# Patient Record
Sex: FEMALE | Race: WHITE | Employment: UNEMPLOYED | URBAN - METROPOLITAN AREA
[De-identification: names, ages, dates, MRNs, and addresses within clinical notes are randomized per-mention and may not be internally consistent; named-entity substitution may affect disease eponyms.]

---

## 2018-09-25 ENCOUNTER — HOSPITAL ENCOUNTER (INPATIENT)
Age: 63
LOS: 7 days | Discharge: HOME | DRG: 885 | End: 2018-10-02
Attending: EMERGENCY MEDICINE | Admitting: PSYCHIATRY & NEUROLOGY

## 2018-09-25 DIAGNOSIS — F31.2 BIPOLAR DISORDER, CURRENT EPISODE MANIC SEVERE WITH PSYCHOTIC FEATURES (HCC): Primary | ICD-10-CM

## 2018-09-25 DIAGNOSIS — F30.9 MANIA (HCC): ICD-10-CM

## 2018-09-25 PROBLEM — F29 PSYCHOSIS (HCC): Status: ACTIVE | Noted: 2018-09-25

## 2018-09-25 LAB
A/G RATIO: 2 (ref 1.1–2.2)
ALBUMIN SERPL-MCNC: 4.8 G/DL (ref 3.4–5)
ALP BLD-CCNC: 89 U/L (ref 40–129)
ALT SERPL-CCNC: 24 U/L (ref 10–40)
AMPHETAMINE SCREEN, URINE: NORMAL
ANION GAP SERPL CALCULATED.3IONS-SCNC: 13 MMOL/L (ref 3–16)
AST SERPL-CCNC: 22 U/L (ref 15–37)
BACTERIA: ABNORMAL /HPF
BARBITURATE SCREEN URINE: NORMAL
BASOPHILS ABSOLUTE: 0 K/UL (ref 0–0.2)
BASOPHILS RELATIVE PERCENT: 0.4 %
BENZODIAZEPINE SCREEN, URINE: NORMAL
BILIRUB SERPL-MCNC: 0.6 MG/DL (ref 0–1)
BILIRUBIN URINE: NEGATIVE
BLOOD, URINE: NEGATIVE
BUN BLDV-MCNC: 13 MG/DL (ref 7–20)
CALCIUM SERPL-MCNC: 10.2 MG/DL (ref 8.3–10.6)
CANNABINOID SCREEN URINE: NORMAL
CHLORIDE BLD-SCNC: 99 MMOL/L (ref 99–110)
CLARITY: CLEAR
CO2: 26 MMOL/L (ref 21–32)
COCAINE METABOLITE SCREEN URINE: NORMAL
COLOR: YELLOW
CREAT SERPL-MCNC: 0.6 MG/DL (ref 0.6–1.2)
EOSINOPHILS ABSOLUTE: 0 K/UL (ref 0–0.6)
EOSINOPHILS RELATIVE PERCENT: 0.5 %
EPITHELIAL CELLS, UA: ABNORMAL /HPF
ETHANOL: NORMAL MG/DL (ref 0–0.08)
GFR AFRICAN AMERICAN: >60
GFR NON-AFRICAN AMERICAN: >60
GLOBULIN: 2.4 G/DL
GLUCOSE BLD-MCNC: 231 MG/DL (ref 70–99)
GLUCOSE URINE: 500 MG/DL
HCT VFR BLD CALC: 42.6 % (ref 36–48)
HEMOGLOBIN: 14.5 G/DL (ref 12–16)
KETONES, URINE: NEGATIVE MG/DL
LEUKOCYTE ESTERASE, URINE: ABNORMAL
LYMPHOCYTES ABSOLUTE: 1.1 K/UL (ref 1–5.1)
LYMPHOCYTES RELATIVE PERCENT: 20 %
Lab: NORMAL
MCH RBC QN AUTO: 31.7 PG (ref 26–34)
MCHC RBC AUTO-ENTMCNC: 34.1 G/DL (ref 31–36)
MCV RBC AUTO: 92.9 FL (ref 80–100)
METHADONE SCREEN, URINE: NORMAL
MICROSCOPIC EXAMINATION: YES
MONOCYTES ABSOLUTE: 0.4 K/UL (ref 0–1.3)
MONOCYTES RELATIVE PERCENT: 7.2 %
NEUTROPHILS ABSOLUTE: 3.9 K/UL (ref 1.7–7.7)
NEUTROPHILS RELATIVE PERCENT: 71.9 %
NITRITE, URINE: NEGATIVE
OPIATE SCREEN URINE: NORMAL
OXYCODONE URINE: NORMAL
PDW BLD-RTO: 13 % (ref 12.4–15.4)
PH UA: 6
PH UA: 6
PHENCYCLIDINE SCREEN URINE: NORMAL
PLATELET # BLD: 256 K/UL (ref 135–450)
PMV BLD AUTO: 9 FL (ref 5–10.5)
POTASSIUM REFLEX MAGNESIUM: 3.7 MMOL/L (ref 3.5–5.1)
PROPOXYPHENE SCREEN: NORMAL
PROTEIN UA: NEGATIVE MG/DL
RBC # BLD: 4.59 M/UL (ref 4–5.2)
RBC UA: ABNORMAL /HPF (ref 0–2)
SALICYLATE, SERUM: 0.4 MG/DL (ref 15–30)
SODIUM BLD-SCNC: 138 MMOL/L (ref 136–145)
SPECIFIC GRAVITY UA: 1.01
TOTAL PROTEIN: 7.2 G/DL (ref 6.4–8.2)
URINE REFLEX TO CULTURE: YES
URINE TYPE: ABNORMAL
UROBILINOGEN, URINE: 0.2 E.U./DL
WBC # BLD: 5.5 K/UL (ref 4–11)
WBC UA: ABNORMAL /HPF (ref 0–5)

## 2018-09-25 PROCEDURE — G0480 DRUG TEST DEF 1-7 CLASSES: HCPCS

## 2018-09-25 PROCEDURE — 87086 URINE CULTURE/COLONY COUNT: CPT

## 2018-09-25 PROCEDURE — 36415 COLL VENOUS BLD VENIPUNCTURE: CPT

## 2018-09-25 PROCEDURE — 99285 EMERGENCY DEPT VISIT HI MDM: CPT

## 2018-09-25 PROCEDURE — 1240000000 HC EMOTIONAL WELLNESS R&B

## 2018-09-25 PROCEDURE — 85025 COMPLETE CBC W/AUTO DIFF WBC: CPT

## 2018-09-25 PROCEDURE — 81001 URINALYSIS AUTO W/SCOPE: CPT

## 2018-09-25 PROCEDURE — 80053 COMPREHEN METABOLIC PANEL: CPT

## 2018-09-25 PROCEDURE — 83036 HEMOGLOBIN GLYCOSYLATED A1C: CPT

## 2018-09-25 PROCEDURE — 6370000000 HC RX 637 (ALT 250 FOR IP): Performed by: PSYCHIATRY & NEUROLOGY

## 2018-09-25 PROCEDURE — 80307 DRUG TEST PRSMV CHEM ANLYZR: CPT

## 2018-09-25 RX ORDER — HYDROXYZINE PAMOATE 50 MG/1
50 CAPSULE ORAL 3 TIMES DAILY PRN
Status: DISCONTINUED | OUTPATIENT
Start: 2018-09-25 | End: 2018-10-02 | Stop reason: HOSPADM

## 2018-09-25 RX ORDER — MAGNESIUM HYDROXIDE/ALUMINUM HYDROXICE/SIMETHICONE 120; 1200; 1200 MG/30ML; MG/30ML; MG/30ML
30 SUSPENSION ORAL EVERY 6 HOURS PRN
Status: DISCONTINUED | OUTPATIENT
Start: 2018-09-25 | End: 2018-10-02 | Stop reason: HOSPADM

## 2018-09-25 RX ORDER — ACETAMINOPHEN 325 MG/1
650 TABLET ORAL EVERY 4 HOURS PRN
Status: DISCONTINUED | OUTPATIENT
Start: 2018-09-25 | End: 2018-10-02 | Stop reason: HOSPADM

## 2018-09-25 RX ORDER — BISACODYL 10 MG
10 SUPPOSITORY, RECTAL RECTAL DAILY PRN
Status: DISCONTINUED | OUTPATIENT
Start: 2018-09-25 | End: 2018-10-02 | Stop reason: HOSPADM

## 2018-09-25 RX ORDER — RISPERIDONE 1 MG/1
1 TABLET, FILM COATED ORAL NIGHTLY
Status: DISCONTINUED | OUTPATIENT
Start: 2018-09-25 | End: 2018-09-26

## 2018-09-25 RX ORDER — HALOPERIDOL 5 MG/ML
5 INJECTION INTRAMUSCULAR EVERY 6 HOURS PRN
Status: DISCONTINUED | OUTPATIENT
Start: 2018-09-25 | End: 2018-10-02 | Stop reason: HOSPADM

## 2018-09-25 RX ORDER — TRAZODONE HYDROCHLORIDE 50 MG/1
50 TABLET ORAL NIGHTLY PRN
Status: DISCONTINUED | OUTPATIENT
Start: 2018-09-25 | End: 2018-09-26

## 2018-09-25 RX ORDER — LANOLIN ALCOHOL/MO/W.PET/CERES
3 CREAM (GRAM) TOPICAL NIGHTLY PRN
COMMUNITY

## 2018-09-25 RX ORDER — IBUPROFEN 400 MG/1
400 TABLET ORAL EVERY 6 HOURS PRN
Status: DISCONTINUED | OUTPATIENT
Start: 2018-09-25 | End: 2018-10-02 | Stop reason: HOSPADM

## 2018-09-25 RX ADMIN — RISPERIDONE 1 MG: 1 TABLET ORAL at 21:20

## 2018-09-25 ASSESSMENT — SLEEP AND FATIGUE QUESTIONNAIRES
RESTFUL SLEEP: NO
DIFFICULTY FALLING ASLEEP: YES
DO YOU USE A SLEEP AID: YES
DIFFICULTY ARISING: NO
DO YOU HAVE DIFFICULTY SLEEPING: YES
SLEEP PATTERN: DIFFICULTY FALLING ASLEEP
DIFFICULTY STAYING ASLEEP: YES
AVERAGE NUMBER OF SLEEP HOURS: 3

## 2018-09-25 ASSESSMENT — LIFESTYLE VARIABLES: HISTORY_ALCOHOL_USE: NO

## 2018-09-25 ASSESSMENT — ENCOUNTER SYMPTOMS
RESPIRATORY NEGATIVE: 1
GASTROINTESTINAL NEGATIVE: 1
EYES NEGATIVE: 1

## 2018-09-25 NOTE — ED PROVIDER NOTES
I independently examined and evaluated Megan James. In brief history of present illness revealed 81-year-old female who presents with  requesting mental health evaluation. Patient reports had episode of depression 3 years ago requiring inpatient stay. Reports improved significantly was weaned off antidepressants over 2 years ago. Now reports return of insomnia and difficulty sleeping. States she can't sleep because she has [place] kiley and peace that I keep thinking about it and talking to God and I can't sleep\". Denies depression or suicidal ideation.  reports symptoms are similar to previous episode when she required hospitalization. Patient denies racing thoughts. Physical examination revealed a well-appearing female who is in no acute distress. She is alert with fluent speech. Affect is effusive with upbeat mood. Denies SI. Skin is warm and dry.     I have reviewed and interpreted all of the currently available lab results from this visit:  Results for orders placed or performed during the hospital encounter of 09/25/18   CBC auto differential   Result Value Ref Range    WBC 5.5 4.0 - 11.0 K/uL    RBC 4.59 4.00 - 5.20 M/uL    Hemoglobin 14.5 12.0 - 16.0 g/dL    Hematocrit 42.6 36.0 - 48.0 %    MCV 92.9 80.0 - 100.0 fL    MCH 31.7 26.0 - 34.0 pg    MCHC 34.1 31.0 - 36.0 g/dL    RDW 13.0 12.4 - 15.4 %    Platelets 091 213 - 367 K/uL    MPV 9.0 5.0 - 10.5 fL    Neutrophils % 71.9 %    Lymphocytes % 20.0 %    Monocytes % 7.2 %    Eosinophils % 0.5 %    Basophils % 0.4 %    Neutrophils # 3.9 1.7 - 7.7 K/uL    Lymphocytes # 1.1 1.0 - 5.1 K/uL    Monocytes # 0.4 0.0 - 1.3 K/uL    Eosinophils # 0.0 0.0 - 0.6 K/uL    Basophils # 0.0 0.0 - 0.2 K/uL   Comprehensive Metabolic Panel w/ Reflex to MG   Result Value Ref Range    Sodium 138 136 - 145 mmol/L    Potassium reflex Magnesium 3.7 3.5 - 5.1 mmol/L    Chloride 99 99 - 110 mmol/L    CO2 26 21 - 32 mmol/L    Anion Gap 13 3 - 16    Glucose 231

## 2018-09-25 NOTE — ED PROVIDER NOTES
the ROS, all other systems were reviewed and negative. PAST MEDICAL HISTORY     Past Medical History:   Diagnosis Date    Depression          SURGICAL HISTORY     History reviewed. No pertinent surgical history. CURRENT MEDICATIONS       Discharge Medication List as of 10/2/2018  9:35 PM      CONTINUE these medications which have NOT CHANGED    Details   melatonin 3 MG TABS tablet Take 3 mg by mouth nightly as neededHistorical Med               ALLERGIES     Patient has no known allergies. FAMILY HISTORY       Family History   Problem Relation Age of Onset    Heart Attack Father     High Blood Pressure Father     Cancer Maternal Grandmother           SOCIAL HISTORY       Social History     Social History    Marital status:      Spouse name: N/A    Number of children: 4    Years of education: N/A     Occupational History     Not Employed     Social History Main Topics    Smoking status: Never Smoker    Smokeless tobacco: Never Used    Alcohol use No    Drug use: No    Sexual activity: Yes     Partners: Male     Other Topics Concern    None     Social History Narrative    None       SCREENINGS    Dodge Coma Scale  Eye Opening: Spontaneous  Best Verbal Response: Oriented  Best Motor Response: Obeys commands  Dodge Coma Scale Score: 15        PHYSICAL EXAM    (up to 7 for level 4, 8 or more for level 5)     ED Triage Vitals [09/25/18 1023]   BP Temp Temp Source Pulse Resp SpO2 Height Weight   (!) 174/92 98.5 °F (36.9 °C) Oral 82 16 99 % 5' 4\" (1.626 m) 157 lb (71.2 kg)       Physical Exam   Constitutional: She is oriented to person, place, and time. She appears well-developed and well-nourished. HENT:   Head: Normocephalic and atraumatic. Nose: Nose normal.   Eyes: Conjunctivae are normal. Right eye exhibits no discharge. Left eye exhibits no discharge. Neck: Trachea normal, normal range of motion and full passive range of motion without pain. Neck supple.    Cardiovascular: Abnormal; Notable for the following:     POC Glucose 168 (*)     All other components within normal limits    Narrative:     Performed at:  [facility] [address],  [place], [employer]   Phone [phone]   POCT GLUCOSE - Abnormal; Notable for the following:     POC Glucose 282 (*)     All other components within normal limits    Narrative:     Performed at:  [facility] [address],  [place], [employer]   Phone [phone]   POCT GLUCOSE - Abnormal; Notable for the following:     POC Glucose 180 (*)     All other components within normal limits    Narrative:     Performed at:  [facility] [address],  [place], [employer]   Phone [phone]   POCT GLUCOSE - Abnormal; Notable for the following:     POC Glucose 211 (*)     All other components within normal limits    Narrative:     Performed at:  [place] [address],  [place], [employer]   Phone [phone]   POCT GLUCOSE - Abnormal; Notable for the following:     POC Glucose 193 (*)     All other components within normal limits    Narrative:     Performed at:  [facility] [address],  [place], [employer]   Phone [phone]   POCT GLUCOSE - Abnormal; Notable for the following:     POC Glucose 195 (*)     All other components within normal limits    Narrative:     Performed at:  [facility] [address],  [place], [employer]   Phone [phone]   POCT GLUCOSE - Abnormal; Notable for the following:     POC Glucose 257 (*)     All other components within normal limits    Narrative:     Performed at:  [facility] [address],  [place], [employer]   Phone [phone]   POCT GLUCOSE - Abnormal; Notable for the following:     POC Glucose 150 (*)     All other components within Laboratory  [address],  [place], [facility]   Phone [phone]   POCT GLUCOSE - Abnormal; Notable for the following:     POC Glucose 192 (*)     All other components within normal limits    Narrative:     Performed at:  [facility] [address],  [place], [facility]   Phone [phone]   POCT GLUCOSE - Abnormal; Notable for the following:     POC Glucose 133 (*)     All other components within normal limits    Narrative:     Performed at:  [place] [address],  [place], [facility]   Phone [phone]   POCT GLUCOSE - Abnormal; Notable for the following:     POC Glucose 127 (*)     All other components within normal limits    Narrative:     Performed at:  [place] [address],  [place], [facility]   Phone [phone]   POCT GLUCOSE - Abnormal; Notable for the following:     POC Glucose 131 (*)     All other components within normal limits    Narrative:     Performed at:  [place] [address],  [place], [facility]   Phone [phone]   POCT GLUCOSE - Abnormal; Notable for the following:     POC Glucose 254 (*)     All other components within normal limits    Narrative:     Performed at:  [place] [address],  [place], [facility]   Phone [phone]   POCT GLUCOSE - Abnormal; Notable for the following:     POC Glucose 128 (*)     All other components within normal limits    Narrative:     Performed at:  [place] [address],  [place], [facility]   Phone [phone]   POCT GLUCOSE - Abnormal; Notable for the following:     POC Glucose 158 (*)     All other components within normal limits    Narrative:     Performed at:  [place] [address],  [place], [facility]   Phone [phone]   POCT GLUCOSE - images are visualized and preliminarily interpreted by the  ED Provider with the below findings:        Interpretation per the Radiologist below, if available at the time of this note:    No orders to display     No results found. PROCEDURES   Unless otherwise noted below, none     Procedures    CRITICAL CARE TIME   N/A    CONSULTS:  None      EMERGENCY DEPARTMENT COURSE and DIFFERENTIAL DIAGNOSIS/MDM:   Vitals:    Vitals:    09/30/18 2012 10/01/18 0830 10/01/18 2011 10/02/18 0845   BP: (!) 141/83 (!) 157/88 (!) 157/85 128/79   Pulse: 71 67 66 63   Resp: 16 16 16 16   Temp: 98.4 °F (36.9 °C) 97.8 °F (36.6 °C) 98.1 °F (36.7 °C) 97.5 °F (36.4 °C)   TempSrc: Oral Oral Oral Oral   SpO2:       Weight:       Height:           Patient was given the following medications:  Medications - No data to display    Patient presents for evaluation of worsening delusions and inability to fall asleep. On exam she is alert, oriented afebrile hemodynamically stable, breathing comfortable on room air, satting at 99%. Denies SI/HI. Denies having symptoms currently. Patient did have a history of major depressive disorder 3 years ago and was hospitalized for over 2 weeks however for the past 2-1/2 years she's been off all medications at this time. However over the last month she feels that her symptoms might be getting worse and with like to be evaluated by psych here. On exam abdomen is soft and nontender without any masses or bulges. Her neck is supple without lymphadenopathy. Conjunctiva is without injection. Patient is nontoxic appearing and in no acute distress. Plan will be to get basic blood work and then have psychiatry evaluate this patient at this time. CBC is unremarkable. BMP is unremarkable. Ethanol level is negative. Urine drug screen is negative. Urine does show some leukocytes and 10-20 white blood cells. However patient is not complaining of any urinary symptoms at this time.   We will have psych

## 2018-09-25 NOTE — ED TRIAGE NOTES
Chief Complaint   Patient presents with    Psychiatric Evaluation      states patient has been having difficulty sleeping and delusional at times. States symptoms are intermittent. States she is not having symptoms today. States 3 years ago she had an episode of depression and was seen by psychiatrist at that time. Denies pain. Denies fever. Denies dysuria.   Denies SI/HI

## 2018-09-25 NOTE — ED NOTES
Report given to Adal Dunham. Pt ambulated back to [facility] accompanied by  and RN with steady gait.      Han Julio RN  09/25/18 8529
\"Everyone thought it was [facility] that helped but really it was God who worked through me and showed me what it was like to have the kiley in me. It was like I was a child again and could relearn all of these new experiences over again. \" Pt states she was started on Wellbutrin and Risperdal at [facility] but eventually stopped taking these medications and has only been taking an herbal supplement called Protandim which she spoke about at length and was unable to be redirected. Pt states she was raised in a strict Islam environment where the girls were to always have their feet covered, were unable to cut their hair and unable to wear jewelry. Pt states she is currently  to a  and reports the relationship as healthy. Pt denies all hx of suicide attempts and self-harm as she believes this to be a sin and states she would go to hell if she were to harm herself in any way. Pt denies all current SA, HI, and SI. Pt states she would like to stay in the hospital and be started on medication to help her sleep.        [address] [place] [address]  09/25/18 4303

## 2018-09-26 PROBLEM — F31.2 BIPOLAR DISORDER, CURRENT EPISODE MANIC SEVERE WITH PSYCHOTIC FEATURES (HCC): Status: ACTIVE | Noted: 2018-09-26

## 2018-09-26 LAB
GLUCOSE BLD-MCNC: 168 MG/DL (ref 70–99)
GLUCOSE BLD-MCNC: 282 MG/DL (ref 70–99)
PERFORMED ON: ABNORMAL
PERFORMED ON: ABNORMAL
URINE CULTURE, ROUTINE: NORMAL

## 2018-09-26 PROCEDURE — 90792 PSYCH DIAG EVAL W/MED SRVCS: CPT | Performed by: NURSE PRACTITIONER

## 2018-09-26 PROCEDURE — 99222 1ST HOSP IP/OBS MODERATE 55: CPT | Performed by: PHYSICIAN ASSISTANT

## 2018-09-26 PROCEDURE — 1240000000 HC EMOTIONAL WELLNESS R&B

## 2018-09-26 PROCEDURE — 6370000000 HC RX 637 (ALT 250 FOR IP): Performed by: PHYSICIAN ASSISTANT

## 2018-09-26 PROCEDURE — 6370000000 HC RX 637 (ALT 250 FOR IP): Performed by: NURSE PRACTITIONER

## 2018-09-26 RX ORDER — OLANZAPINE 5 MG/1
5 TABLET ORAL NIGHTLY
Status: DISCONTINUED | OUTPATIENT
Start: 2018-09-26 | End: 2018-09-28

## 2018-09-26 RX ORDER — NICOTINE POLACRILEX 4 MG
15 LOZENGE BUCCAL PRN
Status: DISCONTINUED | OUTPATIENT
Start: 2018-09-26 | End: 2018-10-02 | Stop reason: HOSPADM

## 2018-09-26 RX ORDER — DEXTROSE MONOHYDRATE 50 MG/ML
100 INJECTION, SOLUTION INTRAVENOUS PRN
Status: DISCONTINUED | OUTPATIENT
Start: 2018-09-26 | End: 2018-10-02 | Stop reason: HOSPADM

## 2018-09-26 RX ORDER — DEXTROSE MONOHYDRATE 25 G/50ML
12.5 INJECTION, SOLUTION INTRAVENOUS PRN
Status: DISCONTINUED | OUTPATIENT
Start: 2018-09-26 | End: 2018-10-02 | Stop reason: HOSPADM

## 2018-09-26 RX ORDER — OLANZAPINE 5 MG/1
5 TABLET ORAL 2 TIMES DAILY PRN
Status: DISCONTINUED | OUTPATIENT
Start: 2018-09-26 | End: 2018-10-02 | Stop reason: HOSPADM

## 2018-09-26 RX ADMIN — INSULIN LISPRO 2 UNITS: 100 INJECTION, SOLUTION INTRAVENOUS; SUBCUTANEOUS at 20:30

## 2018-09-26 RX ADMIN — OLANZAPINE 5 MG: 5 TABLET, FILM COATED ORAL at 20:24

## 2018-09-26 NOTE — H&P
Initial Psychiatric Diagnostic Evaluation  History and Physical    Galen Momin  [id number]      Reason for Admission:  Context: Progression of illness  Location: Mood  Duration: 7 days. Severity on Admission: severe  Associated Symptoms on Admission: veronica, insomnia, increased energy, decreased need to sleep, hyperverbal, tangential, religiously preoccupied, increased self-esteem, increased socialization, increased libido, decreased appetite, mood congruent psychotic symptoms - God talks to her  Modifying Factors: not on medications    Initial Presentation:  Taken from [facility] Note  Patient presents to [facility] voluntarily after she was brought in by her . Patient was clinically sober at the time of the evaluation. Patient was evaluated and offered supportive counseling. Collateral information was gathered by RN from pt's , Minor Hoover. Pt reports her  has been concerned about her because he believes her to be delusional. Pt states she has not been sleeping well for a few weeks and enjoys this time that she is not sleeping because she is able to talk to [address] and God. Pt states she heard Freedom davey to her that she had been saved and became tearful while discussing this moment. Pt states she does not believe she is delusional or hearing voices but that, \"They just don't understand. I do talk to [address]. He tells me the most wonderful things that no one could ever understand! \" Pt states she believes that through [address] she can know what others are thinking and has been able to heal them through their minds. Pt reports hx of being dx with clinical depression and had been admitted to [facility] in [place], [place] 3 years ago. Pt states, \"Everyone thought it was [facility] that helped but really it was God who worked through me and showed me what it was like to have the kiley in me. It was like I was a child again and could relearn all of these new experiences over again. \" Pt states she was started on Wellbutrin Relation Age of Onset    Heart Attack Father     High Blood Pressure Father     Cancer Maternal Grandmother       Prescriptions Prior to Admission: melatonin 3 MG TABS tablet, Take 3 mg by mouth nightly as needed  No Known Allergies     Review of Systems  Review of Systems   Psychiatric/Behavioral: Positive for hallucinations. Negative for depression, memory loss, substance abuse and suicidal ideas. The patient has insomnia. The patient is not nervous/anxious. All other systems reviewed and are negative. Scheduled Meds   OLANZapine  5 mg Oral Nightly       PRN Meds  OLANZapine, acetaminophen, ibuprofen, hydrOXYzine, haloperidol lactate, magnesium hydroxide, bisacodyl, aluminum & magnesium hydroxide-simethicone    OBJECTIVE  Vital Signs:  Vitals:    09/26/18 0755   BP: (!) 134/90   Pulse: 99   Resp: 16   Temp: 97.7 °F (36.5 °C)   SpO2:        Labs:  No results found for this or any previous visit (from the past 24 hour(s)).     Physical Assessment: Per Internal Medicine    PSYCHIATRIC ASSESSMENT   PSYCHIATRIC EXAMINATION / MENTAL STATUS EXAM:     General appearance: []  adequately dressed and groomed [x] disheveled [] Thin [] Obese        Relatedness: [x] cooperative [] guarded [] indifferent [] hostile [] sedated    Speech:  [] normal prosody [x] pressured [] decreased volume [] increased volume [] delayed     Kinetics: [] normal [x] increased [] decreased    Mood: [] depressed [] anxious [] irritable [] labile [x] euphoric [] decreased range    Affect: [x] bright [] blunted [] flat [] labile [] mood incongruent     Thought Process: [] logical [] tangential [] CHANDU [] simplistic [] disorganized [x]FOI [] concrete     Thought Content: [] future oriented [x] delusions [] self-harm [] guilt       [] hopelessness  [] obsessive [] superficial [] paranoid     Hallucination Reported  [] None [x] auditory  [] visual  [] olfactory [] tactile  Observed RTIS:         [x] None [] auditory  [] visual []

## 2018-09-26 NOTE — BH NOTE
[facility]  Initial Interdisciplinary Treatment Plan NOTE    Review Date & Time: 9/26/18 1000    Patient was not in treatment team    Admission Type:   Admission Type: Voluntary    Reason for admission:  Reason for Admission: Hearing voices from Milana Lowe, feels like despair was lifted.  concerned due to lack of sleep.        Estimated Length of Stay Update:  3-5 days  Estimated Discharge Date Update: 9/29/18-10/1/18    PATIENT STRENGTHS:  Patient Strengths Strengths: Communication  Patient Strengths and Limitations:Limitations: Unrealistic self-view  Addictive Behavior:Addictive Behavior  In the past 3 months, have you felt or has someone told you that you have a problem with:  : None  Do you have a history of Chemical Use?: No  Do you have a history of Alcohol Use?: No  Do you have a history of Street Drug Abuse?: No  Histroy of Prescripton Drug Abuse?: No  Medical Problems:  Past Medical History:   Diagnosis Date    Depression        EDUCATION:   Learner Progress Toward Treatment Goals: Reviewed goals and plan of care    Method: Individual    Outcome: Verbalized understanding    PATIENT GOALS: \" get rest\"    PLAN/TREATMENT RECOMMENDATIONS UPDATE: evaluate for treatment    GOALS UPDATE:   Time frame for Short-Term Goals: 2 days    Susan Dutton RN

## 2018-09-27 LAB
ESTIMATED AVERAGE GLUCOSE: 231.7 MG/DL
GLUCOSE BLD-MCNC: 180 MG/DL (ref 70–99)
GLUCOSE BLD-MCNC: 193 MG/DL (ref 70–99)
GLUCOSE BLD-MCNC: 195 MG/DL (ref 70–99)
GLUCOSE BLD-MCNC: 211 MG/DL (ref 70–99)
GLUCOSE BLD-MCNC: 257 MG/DL (ref 70–99)
HBA1C MFR BLD: 9.7 %
PERFORMED ON: ABNORMAL

## 2018-09-27 PROCEDURE — 6370000000 HC RX 637 (ALT 250 FOR IP): Performed by: NURSE PRACTITIONER

## 2018-09-27 PROCEDURE — 99233 SBSQ HOSP IP/OBS HIGH 50: CPT | Performed by: NURSE PRACTITIONER

## 2018-09-27 PROCEDURE — 6370000000 HC RX 637 (ALT 250 FOR IP): Performed by: PHYSICIAN ASSISTANT

## 2018-09-27 PROCEDURE — 1240000000 HC EMOTIONAL WELLNESS R&B

## 2018-09-27 RX ADMIN — METFORMIN HYDROCHLORIDE 500 MG: 500 TABLET ORAL at 21:21

## 2018-09-27 RX ADMIN — INSULIN LISPRO 2 UNITS: 100 INJECTION, SOLUTION INTRAVENOUS; SUBCUTANEOUS at 21:25

## 2018-09-27 RX ADMIN — INSULIN LISPRO 1 UNITS: 100 INJECTION, SOLUTION INTRAVENOUS; SUBCUTANEOUS at 08:34

## 2018-09-27 RX ADMIN — OLANZAPINE 5 MG: 5 TABLET, FILM COATED ORAL at 21:22

## 2018-09-27 RX ADMIN — INSULIN LISPRO 1 UNITS: 100 INJECTION, SOLUTION INTRAVENOUS; SUBCUTANEOUS at 13:29

## 2018-09-27 ASSESSMENT — SLEEP AND FATIGUE QUESTIONNAIRES
DIFFICULTY STAYING ASLEEP: YES
DO YOU HAVE DIFFICULTY SLEEPING: YES
DIFFICULTY ARISING: NO
RESTFUL SLEEP: NO
SLEEP PATTERN: DIFFICULTY FALLING ASLEEP;INSOMNIA
DO YOU USE A SLEEP AID: YES
AVERAGE NUMBER OF SLEEP HOURS: 3
DIFFICULTY FALLING ASLEEP: YES

## 2018-09-27 ASSESSMENT — LIFESTYLE VARIABLES: HISTORY_ALCOHOL_USE: NO

## 2018-09-27 NOTE — BH NOTE
Comments: + interactions, + contribution, and + engagement.   Accomplished goal    Time: 1406-8067      Type of Group: WRAPUP GROUP      Level of Participation: Active participant

## 2018-09-27 NOTE — PROGRESS NOTES
5.            Plan   Reviewed nursing and ancillary staff notes from last 24 hours. Evaluated medications and assessed for side effects and effectiveness. Assessed patient's educational needs including reviewing Plan of Care, medications and diagnosis.     Requires Inpatient Hospitalization as Least Restrictive Environment: YES     1. Bipolar:  · On admission: Started Risperdal 1 mg HS  · Day 1: D/C Risperdal, Trial Zyprexa 5 mg HS, She has absolutely no insight and believes her past depression and current veronica are a result of divine intervention, she was provided with a list of symptoms of bipolar disorder and asked her to review and pick which symptoms she can identify with   · Day 2: Slept 8 hours, more organized, remains euphoric and manic, we reviewed list of symptoms of bipolar disorder, she checked all but 2  · Multidisciplinary evaluation    · Encourage participation in therapeutic programming and milieu activities    · Monitor for safety    2. Legal: voluntary   3.  Discharge:   · Collaborate with SW to gather collateral and determine appropriate community support and disposition     · Follow up/Disposition: TBD    Total time: 35 minutes spent with patient completing evaluation process and more than 50% of the time was spent completing evaluation and planning treatment with the patient    Dr. Ange Sprague, DNP, APRN, PMHNP  09/27/18

## 2018-09-28 LAB
GLUCOSE BLD-MCNC: 148 MG/DL (ref 70–99)
GLUCOSE BLD-MCNC: 150 MG/DL (ref 70–99)
GLUCOSE BLD-MCNC: 154 MG/DL (ref 70–99)
GLUCOSE BLD-MCNC: 195 MG/DL (ref 70–99)
PERFORMED ON: ABNORMAL

## 2018-09-28 PROCEDURE — 6370000000 HC RX 637 (ALT 250 FOR IP): Performed by: PHYSICIAN ASSISTANT

## 2018-09-28 PROCEDURE — 99233 SBSQ HOSP IP/OBS HIGH 50: CPT | Performed by: NURSE PRACTITIONER

## 2018-09-28 PROCEDURE — 6370000000 HC RX 637 (ALT 250 FOR IP): Performed by: NURSE PRACTITIONER

## 2018-09-28 PROCEDURE — 1240000000 HC EMOTIONAL WELLNESS R&B

## 2018-09-28 RX ORDER — OLANZAPINE 5 MG/1
7.5 TABLET ORAL NIGHTLY
Status: DISCONTINUED | OUTPATIENT
Start: 2018-09-28 | End: 2018-10-01

## 2018-09-28 RX ADMIN — INSULIN LISPRO 1 UNITS: 100 INJECTION, SOLUTION INTRAVENOUS; SUBCUTANEOUS at 08:06

## 2018-09-28 RX ADMIN — INSULIN LISPRO 1 UNITS: 100 INJECTION, SOLUTION INTRAVENOUS; SUBCUTANEOUS at 12:02

## 2018-09-28 RX ADMIN — INSULIN LISPRO 1 UNITS: 100 INJECTION, SOLUTION INTRAVENOUS; SUBCUTANEOUS at 21:06

## 2018-09-28 RX ADMIN — METFORMIN HYDROCHLORIDE 500 MG: 500 TABLET ORAL at 08:06

## 2018-09-28 RX ADMIN — METFORMIN HYDROCHLORIDE 500 MG: 500 TABLET ORAL at 17:09

## 2018-09-28 RX ADMIN — INSULIN LISPRO 1 UNITS: 100 INJECTION, SOLUTION INTRAVENOUS; SUBCUTANEOUS at 17:09

## 2018-09-28 RX ADMIN — OLANZAPINE 7.5 MG: 5 TABLET, FILM COATED ORAL at 21:05

## 2018-09-28 NOTE — PROGRESS NOTES
suicidal ideas. The patient is not nervous/anxious and does not have insomnia. All other systems reviewed and are negative.         Social History  Social History     Social History    Marital status:      Spouse name: N/A    Number of children: 4    Years of education: N/A     Occupational History     Not Employed     Social History Main Topics    Smoking status: Never Smoker    Smokeless tobacco: Never Used    Alcohol use No    Drug use: No    Sexual activity: Yes     Partners: Male     Other Topics Concern    None     Social History Narrative    None       Scheduled Meds   OLANZapine  7.5 mg Oral Nightly    metFORMIN  500 mg Oral BID WC    insulin lispro  0-6 Units Subcutaneous TID WC    insulin lispro  0-3 Units Subcutaneous Nightly       PRN Meds  OLANZapine, glucose, dextrose, glucagon (rDNA), dextrose, acetaminophen, ibuprofen, hydrOXYzine, haloperidol lactate, magnesium hydroxide, bisacodyl, aluminum & magnesium hydroxide-simethicone    OBJECTIVE  Labs:  Recent Results (from the past 24 hour(s))   POCT Glucose    Collection Time: 09/27/18  5:09 PM   Result Value Ref Range    POC Glucose 195 (H) 70 - 99 mg/dl    Performed on ACCU-CHEK    POCT Glucose    Collection Time: 09/27/18  9:12 PM   Result Value Ref Range    POC Glucose 257 (H) 70 - 99 mg/dl    Performed on ACCU-CHEK    POCT Glucose    Collection Time: 09/28/18  6:23 AM   Result Value Ref Range    POC Glucose 150 (H) 70 - 99 mg/dl    Performed on ACCU-CHEK    POCT Glucose    Collection Time: 09/28/18 11:56 AM   Result Value Ref Range    POC Glucose 154 (H) 70 - 99 mg/dl    Performed on ACCU-CHEK        Physical Assessment:  Constitutional: No acute distress noted  HEENT: Atraumatic  Cardiovascular: VSS  Respiratory: no distress noted  Neurological: No cranial nerve abnormalities apparent  MS: No abnormalities apparent  Gait: WNL  Psychiatric: Please see below    Vital Signs:  Vitals:    09/26/18 1959 09/27/18 3695 09/27/18 1948 09/28/18 0827   BP: (!) 159/77 125/81 137/80 119/72   Pulse: 65 55 65 58   Resp: 16  16 16   Temp: 98.2 °F (36.8 °C) 98 °F (36.7 °C) 98.3 °F (36.8 °C) 98.2 °F (36.8 °C)   TempSrc: Oral Oral Oral Oral   SpO2:       Weight:       Height:             PSYCHIATRIC ASSESSMENT   Sleep: [] 0-3  [] 4-6 [x] 7-9   [] 10+  Appetite adequate:  [x] Yes  [] No  Attending to hygiene: [x] Yes  [] No   EPS: [] Yes  [x] No  Attending groups: [x] Yes  [] No  Patient reports side effects from psychiatric medications: [x] No  [] Yes:    Patient on more than 1 Antipsychotic: [x] No  [] Yes:    PSYCHIATRIC EXAMINATION / MENTAL STATUS EXAM:      General appearance: [x]  adequately dressed and groomed [] disheveled [] Thin [] Obese                Relatedness: [x] cooperative [] guarded [] indifferent [] hostile [] sedated     Speech:  [] normal prosody [x] pressured [] decreased volume [] increased volume [] delayed      Kinetics: [x] normal [] increased [] decreased     Mood: [] depressed [] anxious [] irritable [] labile [x] euphoric [] decreased range     Affect: [x] bright [] blunted [] flat [] labile [] mood incongruent      Thought Process: [] logical [x] tangential [] CHANDU [] simplistic [] disorganized []FOI [] concrete      Thought Content: [] future oriented [x] delusions [] self-harm [] guilt                             [] hopelessness  [] obsessive [] superficial [] paranoid      Hallucination Reported  [x] None [] auditory  [] visual  [] olfactory [] tactile  Observed RTIS:         [x] None [] auditory  [] visual [] tactile     Delusions: [] none [] grandiose [] paranoid  [] persecutory  [] somatic [x] Adventist/spiritual       Suicidal ideation: [x] denies [] endorses [] plan [] intent [] access  Homicidal ideation: [x] denies [] endorses [] plan [] intent [] access     Insight: [] good [] fair [] poor  [x] none  Judgment: [] good [] fair [x] poor  [] impulsive      Attention and concentration: [] intact [] limited [x]

## 2018-09-29 LAB
GLUCOSE BLD-MCNC: 132 MG/DL (ref 70–99)
GLUCOSE BLD-MCNC: 135 MG/DL (ref 70–99)
GLUCOSE BLD-MCNC: 173 MG/DL (ref 70–99)
GLUCOSE BLD-MCNC: 192 MG/DL (ref 70–99)
GLUCOSE BLD-MCNC: 193 MG/DL (ref 70–99)
PERFORMED ON: ABNORMAL

## 2018-09-29 PROCEDURE — 6370000000 HC RX 637 (ALT 250 FOR IP): Performed by: NURSE PRACTITIONER

## 2018-09-29 PROCEDURE — 6370000000 HC RX 637 (ALT 250 FOR IP): Performed by: PHYSICIAN ASSISTANT

## 2018-09-29 PROCEDURE — 1240000000 HC EMOTIONAL WELLNESS R&B

## 2018-09-29 RX ADMIN — METFORMIN HYDROCHLORIDE 500 MG: 500 TABLET ORAL at 08:06

## 2018-09-29 RX ADMIN — INSULIN LISPRO 1 UNITS: 100 INJECTION, SOLUTION INTRAVENOUS; SUBCUTANEOUS at 20:24

## 2018-09-29 RX ADMIN — OLANZAPINE 7.5 MG: 5 TABLET, FILM COATED ORAL at 20:23

## 2018-09-29 RX ADMIN — INSULIN LISPRO 1 UNITS: 100 INJECTION, SOLUTION INTRAVENOUS; SUBCUTANEOUS at 17:07

## 2018-09-29 RX ADMIN — METFORMIN HYDROCHLORIDE 500 MG: 500 TABLET ORAL at 17:05

## 2018-09-30 LAB
GLUCOSE BLD-MCNC: 127 MG/DL (ref 70–99)
GLUCOSE BLD-MCNC: 131 MG/DL (ref 70–99)
GLUCOSE BLD-MCNC: 133 MG/DL (ref 70–99)
GLUCOSE BLD-MCNC: 254 MG/DL (ref 70–99)
PERFORMED ON: ABNORMAL

## 2018-09-30 PROCEDURE — 6370000000 HC RX 637 (ALT 250 FOR IP): Performed by: PHYSICIAN ASSISTANT

## 2018-09-30 PROCEDURE — 1240000000 HC EMOTIONAL WELLNESS R&B

## 2018-09-30 PROCEDURE — 6370000000 HC RX 637 (ALT 250 FOR IP): Performed by: NURSE PRACTITIONER

## 2018-09-30 PROCEDURE — 99233 SBSQ HOSP IP/OBS HIGH 50: CPT | Performed by: PSYCHIATRY & NEUROLOGY

## 2018-09-30 RX ADMIN — METFORMIN HYDROCHLORIDE 500 MG: 500 TABLET ORAL at 08:05

## 2018-09-30 RX ADMIN — OLANZAPINE 7.5 MG: 5 TABLET, FILM COATED ORAL at 20:15

## 2018-09-30 RX ADMIN — METFORMIN HYDROCHLORIDE 500 MG: 500 TABLET ORAL at 17:12

## 2018-09-30 RX ADMIN — INSULIN LISPRO 2 UNITS: 100 INJECTION, SOLUTION INTRAVENOUS; SUBCUTANEOUS at 20:13

## 2018-09-30 NOTE — PROGRESS NOTES
Basophils % 09/25/2018 0.4  % Final    Neutrophils # 09/25/2018 3.9  1.7 - 7.7 K/uL Final    Lymphocytes # 09/25/2018 1.1  1.0 - 5.1 K/uL Final    Monocytes # 09/25/2018 0.4  0.0 - 1.3 K/uL Final    Eosinophils # 09/25/2018 0.0  0.0 - 0.6 K/uL Final    Basophils # 09/25/2018 0.0  0.0 - 0.2 K/uL Final    Sodium 09/25/2018 138  136 - 145 mmol/L Final    Potassium reflex Magnesium 09/25/2018 3.7  3.5 - 5.1 mmol/L Final    Chloride 09/25/2018 99  99 - 110 mmol/L Final    CO2 09/25/2018 26  21 - 32 mmol/L Final    Anion Gap 09/25/2018 13  3 - 16 Final    Glucose 09/25/2018 231* 70 - 99 mg/dL Final    BUN 09/25/2018 13  7 - 20 mg/dL Final    CREATININE 09/25/2018 0.6  0.6 - 1.2 mg/dL Final    GFR Non- 09/25/2018 >60  >60 Final    Comment: >60 mL/min/1.73m2 EGFR, calc. for ages 25 and older using the  MDRD formula (not corrected for weight), is valid for stable  renal function.  GFR  09/25/2018 >60  >60 Final    Comment: Chronic Kidney Disease: less than 60 ml/min/1.73 sq.m. Kidney Failure: less than 15 ml/min/1.73 sq.m. Results valid for patients 18 years and older.       Calcium 09/25/2018 10.2  8.3 - 10.6 mg/dL Final    Total Protein 09/25/2018 7.2  6.4 - 8.2 g/dL Final    Alb 09/25/2018 4.8  3.4 - 5.0 g/dL Final    Albumin/Globulin Ratio 09/25/2018 2.0  1.1 - 2.2 Final    Total Bilirubin 09/25/2018 0.6  0.0 - 1.0 mg/dL Final    Alkaline Phosphatase 09/25/2018 89  40 - 129 U/L Final    ALT 09/25/2018 24  10 - 40 U/L Final    AST 09/25/2018 22  15 - 37 U/L Final    Globulin 09/25/2018 2.4  g/dL Final    Color, UA 09/25/2018 Yellow  Straw/Yellow Final    Clarity, UA 09/25/2018 Clear  Clear Final    Glucose, Ur 09/25/2018 500* Negative mg/dL Final    Bilirubin Urine 09/25/2018 Negative  Negative Final    Ketones, Urine 09/25/2018 Negative  Negative mg/dL Final    Specific Gravity, UA 09/25/2018 1.010  1.005 - 1.030 Final    Blood, Urine 09/25/2018

## 2018-10-01 PROBLEM — E11.9 DM2 (DIABETES MELLITUS, TYPE 2) (HCC): Status: ACTIVE | Noted: 2018-10-01

## 2018-10-01 LAB
GLUCOSE BLD-MCNC: 128 MG/DL (ref 70–99)
GLUCOSE BLD-MCNC: 158 MG/DL (ref 70–99)
GLUCOSE BLD-MCNC: 165 MG/DL (ref 70–99)
PERFORMED ON: ABNORMAL

## 2018-10-01 PROCEDURE — 6370000000 HC RX 637 (ALT 250 FOR IP): Performed by: NURSE PRACTITIONER

## 2018-10-01 PROCEDURE — 6370000000 HC RX 637 (ALT 250 FOR IP): Performed by: PHYSICIAN ASSISTANT

## 2018-10-01 PROCEDURE — 1240000000 HC EMOTIONAL WELLNESS R&B

## 2018-10-01 PROCEDURE — 99233 SBSQ HOSP IP/OBS HIGH 50: CPT | Performed by: NURSE PRACTITIONER

## 2018-10-01 RX ORDER — OLANZAPINE 10 MG/1
10 TABLET ORAL NIGHTLY
Status: DISCONTINUED | OUTPATIENT
Start: 2018-10-01 | End: 2018-10-02 | Stop reason: HOSPADM

## 2018-10-01 RX ORDER — LANCETS 33 GAUGE
EACH MISCELLANEOUS
Qty: 100 EACH | Refills: 3 | Status: SHIPPED | OUTPATIENT
Start: 2018-10-01 | End: 2018-10-02

## 2018-10-01 RX ORDER — BLOOD-GLUCOSE METER
EACH MISCELLANEOUS
Qty: 1 DEVICE | Refills: 0 | Status: SHIPPED | OUTPATIENT
Start: 2018-10-01 | End: 2018-10-02

## 2018-10-01 RX ADMIN — INSULIN LISPRO 1 UNITS: 100 INJECTION, SOLUTION INTRAVENOUS; SUBCUTANEOUS at 12:12

## 2018-10-01 RX ADMIN — OLANZAPINE 10 MG: 10 TABLET, FILM COATED ORAL at 21:25

## 2018-10-01 RX ADMIN — METFORMIN HYDROCHLORIDE 500 MG: 500 TABLET ORAL at 09:04

## 2018-10-01 RX ADMIN — METFORMIN HYDROCHLORIDE 1000 MG: 500 TABLET ORAL at 17:46

## 2018-10-01 NOTE — BH NOTE
Time: 1600      Type of Group: Health/Wellness  Socialization activities chosen by patient    Level of Participation: Active participation      Comments: Socialization activities chosen by patient (scrabble, coloring)

## 2018-10-01 NOTE — PROGRESS NOTES
Psychiatric Provider Progress Note    Roberto Ramachandran  [phone]    Hospital Day: [address] [facility] [address] Problems    Diagnosis Date Noted    Bipolar disorder, current episode manic severe with psychotic features ([facility] [address] [F31.2] 09/26/2018    Hyperglycemia [R73.9]     Psychosis ([facility] [F29] 09/25/2018       Reason for Admission  Context: Progression of illness  Location: Mood  Duration: 7 days. Severity on Admission: severe  Associated Symptoms on Admission: veronica, insomnia, increased energy, decreased need to sleep, hyperverbal, tangential, religiously preoccupied, increased self-esteem, increased socialization, increased libido, decreased appetite, mood congruent psychotic symptoms - God talks to her  Modifying Factors: not on medications    SUBJECTIVE/UPDATE:  Roberto Ramachandran reports decreased racing thoughts and improved sleep, she is less pressured, mood remains elevated. Slept 5.5 hours. Discussed increasing Zyprexa tonight. Review of Systems  Review of Systems   Psychiatric/Behavioral: Negative for depression, hallucinations, memory loss, substance abuse and suicidal ideas. The patient is not nervous/anxious and does not have insomnia. All other systems reviewed and are negative.         Social History  Social History     Social History    Marital status:      Spouse name: N/A    Number of children: 4    Years of education: N/A     Occupational History     Not Employed     Social History Main Topics    Smoking status: Never Smoker    Smokeless tobacco: Never Used    Alcohol use No    Drug use: No    Sexual activity: Yes     Partners: Male     Other Topics Concern    None     Social History Narrative    None       Scheduled Meds   OLANZapine  10 mg Oral Nightly    metFORMIN  500 mg Oral BID WC    insulin lispro  0-6 Units Subcutaneous TID WC    insulin lispro  0-3 Units Subcutaneous Nightly       PRN Meds  OLANZapine, glucose, dextrose, glucagon (rDNA), normal prosody [x] excessive but non-pressured [] decreased volume [] increased volume [] delayed      Kinetics: [x] normal [] increased [] decreased     Mood: [] depressed [] anxious [] irritable [] labile [x] elevated [] decreased range     Affect: [x] bright [] blunted [] flat [] labile [] mood incongruent      Thought Process: [] logical [x] mildly tangential [] CHANDU [] simplistic [] disorganized []FOI [] concrete      Thought Content: [x] future oriented [] delusions [] self-harm [] guilt                             [] hopelessness  [] obsessive [] superficial [] paranoid      Hallucination Reported  [x] None [] auditory  [] visual  [] olfactory [] tactile  Observed RTIS:         [x] None [] auditory  [] visual [] tactile     Delusions: [x] none [] grandiose [] paranoid  [] persecutory  [] somatic [] Holiness/spiritual       Suicidal ideation: [x] denies [] endorses [] plan [] intent [] access  Homicidal ideation: [x] denies [] endorses [] plan [] intent [] access     Insight: [] good [] fair [x] poor  [] none  Judgment: [] good [] fair [x] poor  [] impulsive      Attention and concentration: [] intact [] limited [x] impaired     Orientation: [x] person, place, time, situation [] disoriented to:         Memory: Remote [x] intact [] impaired                  Recent  [x] intact [] impaired     Diagnoses  1. Bipolar, manic, severe with psychotic features   2.     3.     4.      5.            Plan   Reviewed nursing and ancillary staff notes from last 24 hours. Evaluated medications and assessed for side effects and effectiveness. Assessed patient's educational needs including reviewing Plan of Care, medications and diagnosis.     Requires Inpatient Hospitalization as Least Restrictive Environment: YES     1.  Bipolar:  · On admission: Started Risperdal 1 mg HS  · Day 1: D/C Risperdal, Trial Zyprexa 5 mg HS, She has absolutely no insight and believes her past depression and current veronica are a result of divine intervention, she was provided with a list of symptoms of bipolar disorder and asked her to review and pick which symptoms she can identify with   · Day 2: Slept 8 hours, more organized, remains euphoric and manic, we reviewed list of symptoms of bipolar disorder, she checked all but 2  · Day 3: Trending down but remains euphoric and delusional, increased Zyprexa 7.5 mg HS. · Day 6: Increase Zyprexa 10 mg HS   · Multidisciplinary evaluation    · Encourage participation in therapeutic programming and milieu activities    · Monitor for safety    2. Legal: voluntary   3.  Discharge:   · Collaborate with SW to gather collateral and determine appropriate community support and disposition     · Follow up/Disposition: Refer to [facility] in [place] and PCP Dr. Odilia Price     Total time: 35 minutes spent with patient completing evaluation process and more than 50% of the time was spent completing evaluation and planning treatment with the patient    Dr. Ange Sprague, DNP, APRN, PMHNP  10/01/18

## 2018-10-01 NOTE — BH NOTE
Group Therapy Note    Date: 9/30/2018  Start Time: 20:00  End Time:  21:00  Number of Participants: 14    Type of Group: Recreational  Wrap up    Jonah Pena Information  Module Name:  /  Session Number:  /    Patient's Goal:  Go to group    Notes:  Goal completed    Status After Intervention:  Improved    Participation Level:  Active Listener and Interactive    Participation Quality: Appropriate, Attentive and Sharing      Speech:  normal      Thought Process/Content: Logical      Affective Functioning: Blunted      Mood: anxious      Level of consciousness:  Alert, Oriented x4 and Attentive      Response to Learning: Progressing to goal      Endings: None Reported    Modes of Intervention: Socialization and Problem-solving      Discipline Responsible: Behavorial Health Tech      Signature:  Santiago Gregorio

## 2018-10-02 VITALS
SYSTOLIC BLOOD PRESSURE: 128 MMHG | WEIGHT: 157 LBS | OXYGEN SATURATION: 97 % | RESPIRATION RATE: 16 BRPM | HEART RATE: 63 BPM | TEMPERATURE: 97.5 F | BODY MASS INDEX: 26.8 KG/M2 | HEIGHT: 64 IN | DIASTOLIC BLOOD PRESSURE: 79 MMHG

## 2018-10-02 PROBLEM — F29 PSYCHOSIS (HCC): Status: RESOLVED | Noted: 2018-09-25 | Resolved: 2018-10-02

## 2018-10-02 LAB
GLUCOSE BLD-MCNC: 157 MG/DL (ref 70–99)
PERFORMED ON: ABNORMAL

## 2018-10-02 PROCEDURE — 99239 HOSP IP/OBS DSCHRG MGMT >30: CPT | Performed by: NURSE PRACTITIONER

## 2018-10-02 PROCEDURE — 5130000000 HC BRIDGE APPOINTMENT

## 2018-10-02 PROCEDURE — 6370000000 HC RX 637 (ALT 250 FOR IP): Performed by: PHYSICIAN ASSISTANT

## 2018-10-02 RX ORDER — OLANZAPINE 10 MG/1
10 TABLET ORAL NIGHTLY
Qty: 30 TABLET | Refills: 0 | Status: SHIPPED | OUTPATIENT
Start: 2018-10-02

## 2018-10-02 RX ORDER — BLOOD-GLUCOSE METER
EACH MISCELLANEOUS
Qty: 1 DEVICE | Refills: 0 | Status: SHIPPED | OUTPATIENT
Start: 2018-10-02

## 2018-10-02 RX ORDER — LANCETS 33 GAUGE
EACH MISCELLANEOUS
Qty: 100 EACH | Refills: 0 | Status: SHIPPED | OUTPATIENT
Start: 2018-10-02

## 2018-10-02 RX ADMIN — METFORMIN HYDROCHLORIDE 1000 MG: 500 TABLET ORAL at 08:04

## 2018-10-02 NOTE — DISCHARGE SUMMARY
times daily (with meals)     OLANZapine 10 MG tablet  Commonly known as:  ZYPREXA  Take 1 tablet by mouth nightly        CONTINUE taking these medications    melatonin 3 MG Tabs tablet           Where to Get Your Medications      These medications were sent to [id number], [address] [place], [place] [address]zahra Graff, [address] [address]    Phone:  [phone]   · AGAMATRIX AMP Fabiola  · AGAMATRIX ULTRA-THIN LANCETS Misc  · blood glucose test strips strip  · metFORMIN 1000 MG tablet  · OLANZapine 10 MG tablet         Social History     Social History    Marital status:      Spouse name: N/A    Number of children: 4    Years of education: N/A     Occupational History     Not Employed     Social History Main Topics    Smoking status: Never Smoker    Smokeless tobacco: Never Used    Alcohol use No    Drug use: No    Sexual activity: Yes     Partners: Male     Other Topics Concern    Not on file     Social History Narrative    No narrative on file       Past Medical History:   Diagnosis Date    Depression       History reviewed. No pertinent surgical history.    Social History   Substance Use Topics    Smoking status: Never Smoker    Smokeless tobacco: Never Used    Alcohol use No      Family History   Problem Relation Age of Onset    Heart Attack Father     High Blood Pressure Father     Cancer Maternal Grandmother         Prescriptions Prior to Admission: melatonin 3 MG TABS tablet, Take 3 mg by mouth nightly as needed  No Known Allergies     Objective:  Vital signs in last 24 hours:  Vitals:    10/02/18 0845   BP: 128/79   Pulse: 63   Resp: 16   Temp: 97.5 °F (36.4 °C)   SpO2:        Recent Results (from the past 504 hour(s))   CBC auto differential    Collection Time: 09/25/18 11:05 AM   Result Value Ref Range    WBC 5.5 4.0 - 11.0 K/uL    RBC 4.59 4.00 - 5.20 M/uL    Hemoglobin 14.5 12.0 - 16.0 g/dL    Hematocrit 42.6 Result Value Ref Range    POC Glucose 195 (H) 70 - 99 mg/dl    Performed on ACCU-CHEK    POCT Glucose    Collection Time: 09/27/18  9:12 PM   Result Value Ref Range    POC Glucose 257 (H) 70 - 99 mg/dl    Performed on ACCU-CHEK    POCT Glucose    Collection Time: 09/28/18  6:23 AM   Result Value Ref Range    POC Glucose 150 (H) 70 - 99 mg/dl    Performed on ACCU-CHEK    POCT Glucose    Collection Time: 09/28/18 11:56 AM   Result Value Ref Range    POC Glucose 154 (H) 70 - 99 mg/dl    Performed on ACCU-CHEK    POCT Glucose    Collection Time: 09/28/18  5:06 PM   Result Value Ref Range    POC Glucose 148 (H) 70 - 99 mg/dl    Performed on ACCU-CHEK    POCT Glucose    Collection Time: 09/28/18  8:56 PM   Result Value Ref Range    POC Glucose 195 (H) 70 - 99 mg/dl    Performed on ACCU-CHEK    POCT Glucose    Collection Time: 09/29/18  2:29 AM   Result Value Ref Range    POC Glucose 173 (H) 70 - 99 mg/dl    Performed on ACCU-CHEK    POCT Glucose    Collection Time: 09/29/18  6:03 AM   Result Value Ref Range    POC Glucose 135 (H) 70 - 99 mg/dl    Performed on ACCU-CHEK    POCT Glucose    Collection Time: 09/29/18 11:57 AM   Result Value Ref Range    POC Glucose 132 (H) 70 - 99 mg/dl    Performed on ACCU-CHEK    POCT Glucose    Collection Time: 09/29/18  5:06 PM   Result Value Ref Range    POC Glucose 193 (H) 70 - 99 mg/dl    Performed on ACCU-CHEK    POCT Glucose    Collection Time: 09/29/18  8:21 PM   Result Value Ref Range    POC Glucose 192 (H) 70 - 99 mg/dl    Performed on ACCU-CHEK    POCT Glucose    Collection Time: 09/30/18  6:18 AM   Result Value Ref Range    POC Glucose 133 (H) 70 - 99 mg/dl    Performed on ACCU-CHEK    POCT Glucose    Collection Time: 09/30/18 12:16 PM   Result Value Ref Range    POC Glucose 127 (H) 70 - 99 mg/dl    Performed on ACCU-CHEK    POCT Glucose    Collection Time: 09/30/18  5:02 PM   Result Value Ref Range    POC Glucose 131 (H) 70 - 99 mg/dl    Performed on ACCU-CHEK    POCT Glucose

## 2018-10-02 NOTE — PLAN OF CARE
Problem: Altered Mood, Manic Behavior:  Goal: Able to verbalize decrease in frequency and intensity of racing thoughts  Able to verbalize decrease in frequency and intensity of racing thoughts   Outcome: Ongoing                                                              Group Therapy Note    Date: 9/28/2018  Start Time: 1300  End Time:  1400  Number of Participants: 10    Type of Group: Psychoeducation    Patient's Goal:  To learn positive coping skills by participating in activity of A to Z coping skills and applying to own life                                                                      Notes:  Pt actively participated in the group discussion and was able to apply to herself    Status After Intervention:  Improved    Participation Level:  Active Listener and Interactive    Participation Quality: Appropriate, Attentive, Sharing and Supportive      Speech:  normal      Thought Process/Content: Logical      Affective Functioning: Exaggerated      Mood: anxious      Level of consciousness:  Alert, Oriented x4 and Attentive      Response to Learning: Able to verbalize current knowledge/experience and Able to verbalize/acknowledge new learning      Endings: None Reported    Modes of Intervention: Education, Support, Socialization, Exploration, Clarifying, Problem-solving and Activity      Discipline Responsible: /Counselor      Signature:  MICHAEL Gibson
Problem: Altered Mood, Psychotic Behavior:  Goal: Able to verbalize decrease in frequency and intensity of hallucinations  Able to verbalize decrease in frequency and intensity of hallucinations                                                                       Group Therapy Note    Date: 9/29/2018  Start Time: 1:30PM  End Time:  2:30PM  Number of Participants: 12    Type of Group: Cognitive Skills    Wellness Binder Information  Module Name:  Tab 1, Module 9    Patient's Goal: Pt will identify why positive coping skills are important. They will identify coping skills for each letter of the alphabet and create a self-care calender. Notes:  Pt was intrusive and controlling towards other patients in the group. Status After Intervention:  Unchanged    Participation Level:  Active Listener and Monopolizing    Participation Quality: Sharing and Intrusive      Speech:  normal      Thought Process/Content: Logical      Affective Functioning: Congruent      Mood: euphoric      Level of consciousness:  Alert and Attentive      Response to Learning: Able to verbalize current knowledge/experience, Able to retain information and Progressing to goal      Endings: None Reported    Modes of Intervention: Education, Support, Socialization and Activity      Discipline Responsible: /Counselor      Signature:  KASHMIR Drummond
Problem: Altered Mood, Psychotic Behavior:  Goal: Able to verbalize decrease in frequency and intensity of hallucinations  Able to verbalize decrease in frequency and intensity of hallucinations   Outcome: Ongoing                                                                      Group Therapy Note    Date: 9/29/2018  Start Time: 10:30am  End Time:  12:00pm  Number of Participants: 9    Type of Group: Art Therapy    Patient's Goal:  Pts were directed to choose their art therapy directive: creating a freeform mandala, completing a future vision board and/or creating a free piece of artwork. Pts were encouraged to share their choice of creative expression with the group upon completion. Pts were encouraged to focus on self-expression, freedom of expression, decision making, problem solving and experimenting with new art materials and processes. Notes:  Ernie Aj chose to focus on creating an independent drawing/sketch of a [place]ous landscape. She reported it reminded her of when she lived in [place]. Ernie Aj was able to assist other patients during group with their creative endeavors and spent some time playing the piano for the group. Status After Intervention:  Improved    Participation Level:  Active Listener and Interactive    Participation Quality: Appropriate, Attentive and Supportive      Speech:  pressured      Thought Process/Content: Logical  Linear      Affective Functioning: Constricted/Restricted      Mood: anxious      Level of consciousness:  Alert, Oriented x4 and Attentive      Response to Learning: Able to verbalize current knowledge/experience, Able to verbalize/acknowledge new learning, Able to retain information and Progressing to goal      Endings: None Reported    Modes of Intervention: Education, Support, Exploration, Clarifying, Problem-solving and Activity      Discipline Responsible: Psychoeducational Specialist      Signature:  Jana Finn MS, ATR-BC
Problem: Altered Mood, Psychotic Behavior:  Goal: Able to verbalize reality based thinking  Able to verbalize reality based thinking   Outcome: Ongoing            Group Therapy Note     Date: 9/27/2018  Start Time: 1:30p  End Time: 2:40p  Number of Participants: 7     Type of Group: Cognitive Skills     Recreation Therapy      Patient's Goal: To increase leisure participation and engage with fellow group members. To discuss the importance of leisure for self care.      Notes: Pt attended group session. Pt interacted in the provided recreation stations with fellow group members. Pt reported feeling tired and left group early to lay down.     Status After Intervention:  Improved    Participation Level: Interactive    Participation Quality: Appropriate and Sharing      Speech:  normal      Thought Process/Content: Logical      Affective Functioning: Congruent      Mood: euthymic      Level of consciousness:  Alert and Oriented x4      Response to Learning: Able to verbalize current knowledge/experience, Able to verbalize/acknowledge new learning and Able to retain information      Endings: None Reported    Modes of Intervention: Education, Socialization, Activity and Movement      Discipline Responsible: /Counselor      Signature:  LOBO Whittaker MA, R-DMT, LPCC-S
Problem: Altered Mood, Psychotic Behavior:  Goal: Able to verbalize reality based thinking  Able to verbalize reality based thinking   Outcome: Ongoing  Client has not been reality based at times. Focused on helping and healing peers.  Denies thoughts of wanting to hurt self
Defined Limits         Patient Currently in Pain: No  Daily Nutrition (WDL): Within Defined Limits    Patient Monitoring:  Frequency of Checks: 4 times per hour, close    Psychiatric Symptoms:   Depression Symptoms  Depression Symptoms: No problems reported or observed. Anxiety Symptoms  Anxiety Symptoms: Generalized  Anastasiya Symptoms  Anastasiya Symptoms: No problems reported or observed.      Psychosis Symptoms  Delusion Type: Cheondoism    Suicide Risk CSSR-S:  1) Within the past month, have you wished you were dead or wished you could go to sleep and not wake up? : NO  2) Within the past month, have you actually had any thoughts of killing yourself? : NO  6)  Have you ever done anything, started to do anything, or prepared to do anything to end your life?: NO  Change in Result none Change in Plan of care none      EDUCATION:   Learner Progress Toward Treatment Goals: Reviewed goals and plan of care    Method: Individual    Outcome: Verbalized understanding    PATIENT GOALS: \" keep getting rest, stay focused, go to groups\"    PLAN/TREATMENT RECOMMENDATIONS UPDATE:continue treatment    GOALS UPDATE:   Time frame for Short-Term Goals: 2 days      Yunier Gregorio RN
Pain: Denies  Daily Nutrition (WDL): Within Defined Limits    Patient Monitoring:  Frequency of Checks: 4 times per hour, close    Psychiatric Symptoms:   Depression Symptoms  Depression Symptoms: No problems reported or observed. Anxiety Symptoms  Anxiety Symptoms: No problems reported or observed. Anastasiya Symptoms  Anastasiya Symptoms: No problems reported or observed. Psychosis Symptoms  Delusion Type: No problems reported or observed. Suicide Risk CSSR-S:  1) Within the past month, have you wished you were dead or wished you could go to sleep and not wake up? : NO  2) Within the past month, have you actually had any thoughts of killing yourself? : NO  6)  Have you ever done anything, started to do anything, or prepared to do anything to end your life?: NO  Change in Result none Change in Plan of care none    EDUCATION:   Learner Progress Toward Treatment Goals: Reviewed goals and plan of care    Method: Individual    Outcome: Verbalized understanding    PATIENT GOALS: \" continue resting, relaxing, pay attention to my emotional stressors.  Do things to keep my mind focused\"    PLAN/TREATMENT RECOMMENDATIONS UPDATE: discharge    GOALS UPDATE:  Time frame for Short-Term Goals: today      Fozia Shaw RN

## 2018-10-02 NOTE — BH NOTE
Group Therapy Note    Date: 10/1/2018  Start Time: 20:00  End Time:  21:00  Number of Participants: 9    Type of Group: Recreational  Wrap up    Jonah Pena Information  Module Name:  /  Session Number:  /    Patient's Goal:  Go to groups    Notes:  Goal completed    Status After Intervention:  Improved    Participation Level:  Active Listener and Interactive    Participation Quality: Appropriate, Attentive and Sharing      Speech:  normal      Thought Process/Content: Logical      Affective Functioning: Blunted      Mood: anxious      Level of consciousness:  Alert, Oriented x4 and Attentive      Response to Learning: Progressing to goal      Endings: None Reported    Modes of Intervention: Socialization and Problem-solving      Discipline Responsible: Behavorial Health Tech      Signature:  Magda Edward

## 2018-10-03 NOTE — PROGRESS NOTES
Follow up call completed. Writer was able to speak with the patient. Patient did not have any questions regarding their discharge.

## 2020-04-13 ENCOUNTER — HOSPITAL ENCOUNTER (EMERGENCY)
Age: 65
Discharge: HOME | End: 2020-04-13

## 2020-04-13 ENCOUNTER — APPOINTMENT (OUTPATIENT)
Dept: CT IMAGING | Age: 65
End: 2020-04-13

## 2020-04-13 VITALS
DIASTOLIC BLOOD PRESSURE: 90 MMHG | OXYGEN SATURATION: 96 % | BODY MASS INDEX: 25.61 KG/M2 | TEMPERATURE: 97.1 F | HEIGHT: 64 IN | RESPIRATION RATE: 16 BRPM | HEART RATE: 84 BPM | WEIGHT: 150 LBS | SYSTOLIC BLOOD PRESSURE: 144 MMHG

## 2020-04-13 LAB
A/G RATIO: 1.4 (ref 1.1–2.2)
ACETAMINOPHEN LEVEL: <5 UG/ML (ref 10–30)
ALBUMIN SERPL-MCNC: 4.2 G/DL (ref 3.4–5)
ALP BLD-CCNC: 71 U/L (ref 40–129)
ALT SERPL-CCNC: 16 U/L (ref 10–40)
AMPHETAMINE SCREEN, URINE: NORMAL
ANION GAP SERPL CALCULATED.3IONS-SCNC: 14 MMOL/L (ref 3–16)
AST SERPL-CCNC: 21 U/L (ref 15–37)
BACTERIA: ABNORMAL /HPF
BARBITURATE SCREEN URINE: NORMAL
BASOPHILS ABSOLUTE: 0 K/UL (ref 0–0.2)
BASOPHILS RELATIVE PERCENT: 0.3 %
BENZODIAZEPINE SCREEN, URINE: NORMAL
BILIRUB SERPL-MCNC: 0.3 MG/DL (ref 0–1)
BILIRUBIN URINE: NEGATIVE
BLOOD, URINE: ABNORMAL
BUN BLDV-MCNC: 15 MG/DL (ref 7–20)
CALCIUM SERPL-MCNC: 10.3 MG/DL (ref 8.3–10.6)
CANNABINOID SCREEN URINE: NORMAL
CHLORIDE BLD-SCNC: 101 MMOL/L (ref 99–110)
CHP ED QC CHECK: YES
CLARITY: ABNORMAL
CO2: 24 MMOL/L (ref 21–32)
COCAINE METABOLITE SCREEN URINE: NORMAL
COLOR: YELLOW
CREAT SERPL-MCNC: <0.5 MG/DL (ref 0.6–1.2)
CRYSTALS, UA: ABNORMAL /HPF
EOSINOPHILS ABSOLUTE: 0 K/UL (ref 0–0.6)
EOSINOPHILS RELATIVE PERCENT: 0.4 %
EPITHELIAL CELLS, UA: ABNORMAL /HPF (ref 0–5)
ETHANOL: NORMAL MG/DL (ref 0–0.08)
GFR AFRICAN AMERICAN: >60
GFR NON-AFRICAN AMERICAN: >60
GLOBULIN: 3.1 G/DL
GLUCOSE BLD-MCNC: 182 MG/DL
GLUCOSE BLD-MCNC: 182 MG/DL (ref 70–99)
GLUCOSE BLD-MCNC: 222 MG/DL (ref 70–99)
GLUCOSE URINE: 250 MG/DL
HCT VFR BLD CALC: 45 % (ref 36–48)
HEMOGLOBIN: 15 G/DL (ref 12–16)
KETONES, URINE: 15 MG/DL
LEUKOCYTE ESTERASE, URINE: ABNORMAL
LYMPHOCYTES ABSOLUTE: 1.2 K/UL (ref 1–5.1)
LYMPHOCYTES RELATIVE PERCENT: 19.7 %
Lab: NORMAL
MCH RBC QN AUTO: 31.5 PG (ref 26–34)
MCHC RBC AUTO-ENTMCNC: 33.3 G/DL (ref 31–36)
MCV RBC AUTO: 94.6 FL (ref 80–100)
METHADONE SCREEN, URINE: NORMAL
MICROSCOPIC EXAMINATION: YES
MONOCYTES ABSOLUTE: 0.4 K/UL (ref 0–1.3)
MONOCYTES RELATIVE PERCENT: 6.9 %
MUCUS: ABNORMAL /LPF
NEUTROPHILS ABSOLUTE: 4.6 K/UL (ref 1.7–7.7)
NEUTROPHILS RELATIVE PERCENT: 72.7 %
NITRITE, URINE: NEGATIVE
OPIATE SCREEN URINE: NORMAL
OXYCODONE URINE: NORMAL
PDW BLD-RTO: 13.3 % (ref 12.4–15.4)
PERFORMED ON: ABNORMAL
PH UA: 5.5
PH UA: 5.5 (ref 5–8)
PHENCYCLIDINE SCREEN URINE: NORMAL
PLATELET # BLD: 282 K/UL (ref 135–450)
PMV BLD AUTO: 8.6 FL (ref 5–10.5)
POTASSIUM REFLEX MAGNESIUM: 3.8 MMOL/L (ref 3.5–5.1)
PROPOXYPHENE SCREEN: NORMAL
PROTEIN UA: NEGATIVE MG/DL
RBC # BLD: 4.76 M/UL (ref 4–5.2)
RBC UA: ABNORMAL /HPF (ref 0–4)
SALICYLATE, SERUM: 0.5 MG/DL (ref 15–30)
SODIUM BLD-SCNC: 139 MMOL/L (ref 136–145)
SPECIFIC GRAVITY UA: >=1.03 (ref 1–1.03)
TOTAL PROTEIN: 7.3 G/DL (ref 6.4–8.2)
URINE REFLEX TO CULTURE: YES
URINE TYPE: ABNORMAL
UROBILINOGEN, URINE: 0.2 E.U./DL
WBC # BLD: 6.3 K/UL (ref 4–11)
WBC UA: ABNORMAL /HPF (ref 0–5)

## 2020-04-13 PROCEDURE — 70450 CT HEAD/BRAIN W/O DYE: CPT

## 2020-04-13 PROCEDURE — 87086 URINE CULTURE/COLONY COUNT: CPT

## 2020-04-13 PROCEDURE — 99284 EMERGENCY DEPT VISIT MOD MDM: CPT

## 2020-04-13 PROCEDURE — G0480 DRUG TEST DEF 1-7 CLASSES: HCPCS

## 2020-04-13 PROCEDURE — 85025 COMPLETE CBC W/AUTO DIFF WBC: CPT

## 2020-04-13 PROCEDURE — 87077 CULTURE AEROBIC IDENTIFY: CPT

## 2020-04-13 PROCEDURE — 81001 URINALYSIS AUTO W/SCOPE: CPT

## 2020-04-13 PROCEDURE — 80053 COMPREHEN METABOLIC PANEL: CPT

## 2020-04-13 PROCEDURE — 6370000000 HC RX 637 (ALT 250 FOR IP): Performed by: NURSE PRACTITIONER

## 2020-04-13 PROCEDURE — 80307 DRUG TEST PRSMV CHEM ANLYZR: CPT

## 2020-04-13 RX ORDER — ESCITALOPRAM OXALATE 10 MG/1
10 TABLET ORAL DAILY
COMMUNITY

## 2020-04-13 RX ORDER — CEPHALEXIN 500 MG/1
500 CAPSULE ORAL 4 TIMES DAILY
Qty: 40 CAPSULE | Refills: 0 | Status: SHIPPED | OUTPATIENT
Start: 2020-04-13 | End: 2020-04-23

## 2020-04-13 RX ORDER — CEPHALEXIN 500 MG/1
500 CAPSULE ORAL ONCE
Status: COMPLETED | OUTPATIENT
Start: 2020-04-13 | End: 2020-04-13

## 2020-04-13 RX ORDER — LORAZEPAM 0.5 MG/1
0.5 TABLET ORAL 3 TIMES DAILY PRN
COMMUNITY

## 2020-04-13 RX ADMIN — CEPHALEXIN 500 MG: 500 CAPSULE ORAL at 18:38

## 2020-04-13 ASSESSMENT — ENCOUNTER SYMPTOMS
RHINORRHEA: 0
ABDOMINAL PAIN: 0
SORE THROAT: 0
SHORTNESS OF BREATH: 0
COLOR CHANGE: 0

## 2020-04-13 NOTE — ED NOTES
Patient brought back and oriented to [facility]. Will continue to monitor patient. Patient changed out and oriented to [facility]. Will continue to monitor patient.       Igor Montiel RN  04/13/20 [id number]

## 2020-04-13 NOTE — ED NOTES
Presenting Problem: Anxiety/ Depression     Appearance/Hygiene:  hospital attire, good grooming and good hygiene   Motor Behavior: wnl   Attitude: cooperative  Affect: depressed anxious affect   Speech: soft  Mood: anxious and depressed   Thought Processes: [place] and Logical  Perceptions: Absent   Thought content: wnl   Suicidal ideation:  no specific plan to harm self   Homicidal ideation:  none  Orientation: A&Ox4   Memory: intact  Concentration: Fair    Insight/ judgement: normal insight and judgment      Psychosocial and contextual factors: Reports she lives with her  whom is a . Reports no known stress and she is not sure why she feels this way.      C-SSRS Summary (including current and past suicidal ideation, plan, intent, and attempts) : Denies SI denies past suicide attempts    Psychiatric History: Bipolar     Patient reported diagnosis : Depression    Outpatient services/ Provider: none    Previous Inpatient Admissions( including location and dates if known): here at [facility] in 9/2018 and was admitted at [facility] 3 years ago    Self-injurious/ Self-harm behavior: denies     History of violence: denies    Current Substance use: denies    Trauma identified: none    Access to Firearms: denies    ASSESSMENT FOR IMMINENT FUTURE DANGER:      RISK FACTORS:    [x]  Age <25 or >49   []  Male gender   [x]  Depressed mood   []  Active suicidal ideation   []  Suicide plan   []  Suicide attempt   []  Access to lethal means   []  Prior suicide attempt   []  Active substance abuse   []  Highly impulsive behaviors   []  Not attending to self-care/ADLs    []  Recent significant loss   []  Chronic pain or medical illness   [x]  Social isolation- per    []  History of violence   []  Active psychosis   []  Cognitive impairment    [x]  No outpatient services in place   []  Medication noncompliance   []  No collateral information to support safety   []  Decreased appetite    PROTECTIVE FACTORS:  [] Age >25 and <55  [x] Female gender   [] Denies depression  [x] Denies suicidal ideation  [x] Does not have lethal plan   [x] Does not have access to guns or weapons  [x] Patient is verbally rene for safety  [x] No prior suicide attempts  [x] No active substance abuse  [x] Patient has social or family support  [x] No active psychosis or cognitive dysfunction  [x] Physically healthy  [] Has outpatient services in place  [x] Compliant with recommended medications  [] Collateral information from  supports patient safety - reports he does not feel she would ever harm self or others and no past attempts   [x] Patient is future oriented with plans to follow up with outpatient provider  [x] adequate sleep on ativan      Clinical Summary:    Patient presents to ED voluntarily drove by . Patient has soft voice and slow to answer. Patient appears guarded. Patient does not appear to be responding to internal stimuli. Patient reports she has been having anxiety. Reports she has had increased depression. Denies SI and denies any past suicidal thoughts or attempts. Patient reports she has been sleeping ok since she started on Ativan by her PCP. Reports decreased appetite. Patient reports she is not sure what she feels we can do here. When asked if she would be willing to stay in hospital declined. Reports she did not want to stay. Patient reports she did see a therapist in past after last admission however got better and stopped going. Discussed outpatient follow up. Patient agreeable. Patient was clinically sober at the time of the evaluation. Patient was evaluated and offered supportive counseling. Collateral information was gathered by this nurse from patient  Romelia Landon. He reports she has no past attempts of suicide or harm to others and does not feel she would ever hurt herself or others. Reports he feels she may need a medication adjustment and is concerned about her anxiety and depression.  Reports she

## 2020-04-13 NOTE — ED PROVIDER NOTES
Phone [phone]   POCT GLUCOSE - Normal   CULTURE, URINE   CBC WITH AUTO DIFFERENTIAL    Narrative:     Performed at:  [facility] [address],  [place], [facility]   Phone [phone]   URINE DRUG SCREEN    Narrative:     Performed at:  [facility] [address],  [place], [facility]   Phone [phone]   ETHANOL    Narrative:     Performed at:  [facility]) [facility] [address],  [place], [facility]   Phone [phone]       All other labs werewithin normal range or not returned as of this dictation. EKG: All EKG's are interpreted by the Emergency Department Physician who either signs or Co-signs this chart in the absence of acardiologist.  Please see their note for interpretation of EKG. RADIOLOGY:   Head CT interpreted by radiologist for no acute intracranial abnormality. Interpretation per the Radiologist below, if available at the time of this note:    CT Head WO Contrast   Final Result   No acute intracranial abnormality. No results found. PROCEDURES   Unless otherwise noted below, none     Procedures     CRITICAL CARE TIME   N/A    CONSULTS:  None      EMERGENCYDEPARTMENT COURSE and DIFFERENTIAL DIAGNOSIS/MDM:   Vitals:    Vitals:    04/13/20 1628   BP: (!) 144/90   Pulse: 84   Resp: 16   Temp: 97.1 °F (36.2 °C)   TempSrc: Oral   SpO2: 96%   Weight: 150 lb (68 kg)   Height: 5' 4\" (1.626 m)       Patient was given the following medications:  Medications   cephALEXin (KEFLEX) capsule 500 mg (500 mg Oral Given 4/13/20 1838)       Patient was seen and evaluated by myself. Patient here for complaints of feeling sad. Patient denies any suicidal or homicidal ideations. She denies any complaints of pain. On exam she is awake and alert she is flat in her affect but she is hemodynamically stable nontoxic in appearance. Lab values have been reviewed and interpreted.

## 2020-04-13 NOTE — ED NOTES
Level of Care Disposition: Discharge      Patient was seen by ED provider and [facility] AN AFFILIATE OF [facility] staff. The case was presented to psychiatric provider on-call Dr. Magui Quiles. Based on the ED evaluation and information presented to the provider by this nurse the decision was made to discharge patient with the following referrals: Outpatient treatment          RATIONALE FOR NON-ADMISSION:  The patient does not meet criteria for an involuntary psychiatric admission because is not presenting an imminent risk to self or others and has a plan to follow-up with outpatient services . Patient has demonstrated a reasonable safety plan.              Krista Granado RN  04/13/20 5573

## 2020-04-13 NOTE — ED NOTES
Patient has order to discharge home. Patient given discharge instructions. Patient states she understands. Patient accompanied to lobby.  in lobby. Patient left with .       Moses Huynh RN  04/13/20 7586

## 2020-04-14 LAB
ORGANISM: ABNORMAL
URINE CULTURE, ROUTINE: ABNORMAL
URINE CULTURE, ROUTINE: ABNORMAL